# Patient Record
Sex: MALE | Race: WHITE | NOT HISPANIC OR LATINO | URBAN - METROPOLITAN AREA
[De-identification: names, ages, dates, MRNs, and addresses within clinical notes are randomized per-mention and may not be internally consistent; named-entity substitution may affect disease eponyms.]

---

## 2018-12-08 ENCOUNTER — EMERGENCY (EMERGENCY)
Facility: HOSPITAL | Age: 33
LOS: 0 days | Discharge: HOME | End: 2018-12-09
Admitting: PHYSICIAN ASSISTANT

## 2018-12-08 VITALS
RESPIRATION RATE: 18 BRPM | TEMPERATURE: 99 F | DIASTOLIC BLOOD PRESSURE: 79 MMHG | OXYGEN SATURATION: 97 % | SYSTOLIC BLOOD PRESSURE: 116 MMHG | HEART RATE: 107 BPM

## 2018-12-08 VITALS — WEIGHT: 229.94 LBS | HEIGHT: 78 IN

## 2018-12-08 DIAGNOSIS — Z79.899 OTHER LONG TERM (CURRENT) DRUG THERAPY: ICD-10-CM

## 2018-12-08 DIAGNOSIS — M79.671 PAIN IN RIGHT FOOT: ICD-10-CM

## 2018-12-08 DIAGNOSIS — Z88.0 ALLERGY STATUS TO PENICILLIN: ICD-10-CM

## 2018-12-08 RX ORDER — KETOROLAC TROMETHAMINE 30 MG/ML
30 SYRINGE (ML) INJECTION ONCE
Qty: 0 | Refills: 0 | Status: DISCONTINUED | OUTPATIENT
Start: 2018-12-08 | End: 2018-12-08

## 2018-12-08 RX ADMIN — Medication 30 MILLIGRAM(S): at 23:27

## 2018-12-08 NOTE — ED ADULT NURSE NOTE - NSIMPLEMENTINTERV_GEN_ALL_ED
Implemented All Universal Safety Interventions:  [place] to call system. Call bell, personal items and telephone within reach. Instruct patient to call for assistance. Room bathroom lighting operational. Non-slip footwear when patient is off stretcher. Physically safe environment: no spills, clutter or unnecessary equipment. Stretcher in lowest position, wheels locked, appropriate side rails in place.

## 2018-12-09 LAB — D DIMER BLD IA.RAPID-MCNC: 162 NG/ML DDU — SIGNIFICANT CHANGE UP (ref 0–230)

## 2018-12-09 RX ORDER — IBUPROFEN 200 MG
1 TABLET ORAL
Qty: 60 | Refills: 0 | OUTPATIENT
Start: 2018-12-09 | End: 2018-12-28

## 2018-12-09 NOTE — ED PROVIDER NOTE - NS ED ROS FT
Constitutional: no fever, chills, no recent weight loss, change in appetite or malaise  Cardiac: No chest pain, SOB or edema.  Respiratory: No cough or respiratory distress  GI: No nausea, vomiting, diarrhea or abdominal pain.  MS: + right foot/ankle swelling  Neuro: No decreased sensation, paresthesias  Skin: No skin rash/skin changes/redness  Except as documented in the HPI, all other systems are negative.

## 2018-12-09 NOTE — ED PROVIDER NOTE - PHYSICAL EXAMINATION
CONSTITUTIONAL: Well-appearing; well-nourished; in no apparent distress.   CARDIOVASCULAR: Normal S1, S2; no murmurs, rubs, or gallops.   RESPIRATORY: Normal chest excursion with respiration; breath sounds clear and equal bilaterally; no wheezes, rhonchi, or rales.  GI/: Normal bowel sounds; non-distended; non-tender; no palpable organomegaly.   MS: + swelling over right foot /ankle. + full rom of ankle/foot. + ttp over lateral malleolus and midfoot. Pedal pulses intact.  SKIN: Normal for age and race; no skin changes/erythema/warmth  NEURO/PSYCH: A & O x 4; grossly unremarkable. mood and manner are appropriate. Sensation intact.

## 2018-12-09 NOTE — ED PROVIDER NOTE - OBJECTIVE STATEMENT
33 year old male with hx of gout on colchicine/allopurinol c/o right foot/ankle pain and swelling x 1 day. Pt has had difficulty walking on his foot due to the pain. Denies any trauma, fever/chills, skin changes, warmth, paresthesias, calf pain, smoking hx, recent travel.

## 2018-12-09 NOTE — ED PROVIDER NOTE - NSFOLLOWUPCLINICS_GEN_ALL_ED_FT
[facility]  Medicine  [address], [place]   Phone: [phone]  Fax:   Follow Up Time:     [facility]  Pediatric  .  [place]   Phone: [phone]  Fax:   Follow Up Time:

## 2019-05-28 ENCOUNTER — EMERGENCY (EMERGENCY)
Facility: HOSPITAL | Age: 34
LOS: 0 days | Discharge: HOME | End: 2019-05-28
Admitting: EMERGENCY MEDICINE
Payer: SUBSIDIZED

## 2019-05-28 VITALS
SYSTOLIC BLOOD PRESSURE: 131 MMHG | WEIGHT: 229.94 LBS | DIASTOLIC BLOOD PRESSURE: 87 MMHG | TEMPERATURE: 98 F | OXYGEN SATURATION: 95 % | RESPIRATION RATE: 18 BRPM | HEART RATE: 70 BPM

## 2019-05-28 DIAGNOSIS — R07.0 PAIN IN THROAT: ICD-10-CM

## 2019-05-28 DIAGNOSIS — Z79.1 LONG TERM (CURRENT) USE OF NON-STEROIDAL ANTI-INFLAMMATORIES (NSAID): ICD-10-CM

## 2019-05-28 DIAGNOSIS — Z88.0 ALLERGY STATUS TO PENICILLIN: ICD-10-CM

## 2019-05-28 PROBLEM — M10.9 GOUT, UNSPECIFIED: Chronic | Status: ACTIVE | Noted: 2018-12-08

## 2019-05-28 PROCEDURE — 70360 X-RAY EXAM OF NECK: CPT | Mod: 26

## 2019-05-28 PROCEDURE — 99283 EMERGENCY DEPT VISIT LOW MDM: CPT | Mod: 25

## 2019-05-28 PROCEDURE — 99053 MED SERV 10PM-8AM 24 HR FAC: CPT

## 2019-05-28 RX ORDER — DEXAMETHASONE 0.5 MG/5ML
10 ELIXIR ORAL ONCE
Refills: 0 | Status: COMPLETED | OUTPATIENT
Start: 2019-05-28 | End: 2019-05-28

## 2019-05-28 RX ADMIN — Medication 10 MILLIGRAM(S): at 02:00

## 2019-05-28 NOTE — ED PROVIDER NOTE - CARE PROVIDER_API CALL
Gia Ac)  Otolaryngology; Surgery  [address] [place] [address]  Phone: [phone]  Fax: [phone]  Follow Up Time: 1-3 Days    Merrick Feliciano)  Otolaryngology  [address] [place] [address], [address] [place] [address]  Phone: [phone]  Fax: [phone]  Follow Up Time: 1-3 Days

## 2019-05-28 NOTE — ED PROVIDER NOTE - NSFOLLOWUPINSTRUCTIONS_ED_ALL_ED_FT
DISCHARGE INSTRUCTIONS:    Return to the emergency department if:     You have a fever.      You have severe abdominal pain, nausea, or vomiting.       Your vomit or saliva is bloody.      Your bowel movements are black or bloody.     Contact your healthcare provider if:     You do not find the object in your bowel movement within 2 or 3 days.      You do not want to eat because of abdominal pain or vomiting.      You are drooling or hoarse.      You have questions or concerns about your condition or care.    Look for the object in your bowel movements: Search for the dental work, battery, or other small, smooth object each time you have a bowel movement. Do not use laxatives or stool softeners. Do not force yourself to vomit. DISCHARGE INSTRUCTIONS:    Return to the emergency department if:     You have a fever.      You have severe abdominal pain, nausea, or vomiting.       Your vomit or saliva is bloody.      Your bowel movements are black or bloody.     Contact your healthcare provider if:     You do not find the object in your bowel movement within 2 or 3 days.      You do not want to eat because of abdominal pain or vomiting.      You are drooling or hoarse.      You have questions or concerns about your condition or care.    Look for the object in your bowel movements: Search for the dental work, battery, or other small, smooth object each time you have a bowel movement. Do not use laxatives or stool softeners. Do not force yourself to vomit.    Many things can cause a sore throat, including:  An infection.  Seasonal allergies.  Dryness in the air.  Irritants, such as smoke or pollution.  Gastroesophageal reflux disease (GERD).  A tumor.  A sore throat is often the first sign of another sickness. It may happen with other symptoms, such as coughing, sneezing, fever, and swollen neck glands. Most sore throats go away without medical treatment.    Follow these instructions at home:  Image Image   Take over-the-counter medicines only as told by your health care provider.  Drink enough fluids to keep your urine clear or pale yellow.  Rest as needed.  To help with pain, try:  Sipping warm liquids, such as broth, herbal tea, or warm water.  Eating or drinking cold or frozen liquids, such as frozen ice pops.  Gargling with a salt-water mixture 3–4 times a day or as needed. To make a salt-water mixture, completely dissolve ½–1 tsp of salt in 1 cup of warm water.  Sucking on hard candy or throat lozenges.  Putting a cool-mist humidifier in your bedroom at night to moisten the air.  Sitting in the bathroom with the door closed for 5–10 minutes while you run hot water in the shower.  Do not use any tobacco products, such as cigarettes, chewing tobacco, and e-cigarettes. If you need help quitting, ask your health care provider.  Contact a health care provider if:  You have a fever for more than 2–3 days.  You have symptoms that last (are persistent) for more than 2–3 days.  Your throat does not get better within 7 days.  You have a fever and your symptoms suddenly get worse.  Get help right away if:  You have difficulty breathing.  You cannot swallow fluids, soft foods, or your saliva.  You have increased swelling in your throat or neck.  You have persistent nausea and vomiting.  This information is not intended to replace advice given to you by your health care provider. Make sure you discuss any questions you have with your health care provider.

## 2019-05-28 NOTE — ED PROVIDER NOTE - CLINICAL SUMMARY MEDICAL DECISION MAKING FREE TEXT BOX
33 yo M with no pmhx presenting with sensation that something is stuck to left side of throat x 1 day. Soft tissue neck xr negative. Airway clear. No swelling noted. Will refer to ENT. I have discussed the discharge plan with the patient. The patient agrees with the plan, as discussed.  The patient understands Emergency Department diagnosis is a preliminary diagnosis often based on limited information and that the patient must adhere to the follow-up plan as discussed.  The patient understands that if the symptoms worsen or if prescribed medications do not have the desired/planned effect that the patient may return to the Emergency Department at any time for further evaluation and treatment.

## 2019-05-28 NOTE — ED PROVIDER NOTE - PROVIDER TOKENS
PROVIDER:[TOKEN:[[id number]],FOLLOWUP:[1-3 Days]],PROVIDER:[TOKEN:[[id number]],FOLLOWUP:[1-3 Days]]

## 2019-05-28 NOTE — ED PROVIDER NOTE - NS ED ROS FT
Review of Systems:  	•	CONSTITUTIONAL - no fever, no diaphoresis, no chills  	•	SKIN - no rash  	•	HEMATOLOGIC - no bleeding, no bruising  	•	EYES - no eye pain, no blurry vision  	•	ENT - +sensation of something in left side of throat, no congestion  	•	RESPIRATORY - no shortness of breath, no cough  	•	CARDIAC - no chest pain, no palpitations  	•	GI - no abd pain, no nausea, no vomiting, no diarrhea, no constipation  	•	GENITO-URINARY - no dysuria; no hematuria, no increased urinary frequency  	•	MUSCULOSKELETAL - no joint paint, no swelling, no redness  	•	NEUROLOGIC - no weakness, no headache, no paresthesias, no LOC  	All other ROS are negative except as documented in HPI.

## 2019-05-28 NOTE — ED PROVIDER NOTE - OBJECTIVE STATEMENT
35 yo M with no pmhx presenting with sensation that something is stuck to left side of throat x 1 day. Symptoms are moderate. No alleviating/aggravating factors. States he ate today with no issues. Never choked on food. 33 yo M with no pmhx presenting with sensation that something is stuck to left side of throat x 1 day. Symptoms are moderate. No alleviating/aggravating factors. States he ate today with no issues. Never choked on food. No difficulty breathing. No trismus. No cp, sob, fever, chills, abdominal pain, nausea, vomiting, diarrhea, back pain, urinary symptoms, headache, dizziness, paresthesias, or weakness.

## 2019-05-28 NOTE — ED PROVIDER NOTE - PHYSICAL EXAMINATION
VITAL SIGNS: I have reviewed nursing notes and confirm.  CONSTITUTIONAL: Well-developed; well-nourished; in no acute distress.  SKIN: Skin exam is warm and dry, no acute rash.  HEAD: Normocephalic; atraumatic.  EYES: PERRL, EOM intact; conjunctiva and sclera clear.  ENT: No nasal discharge; airway clear. Tonsils normal bilaterally. No swelling or erythema. No exudate. Uvula midline.   NECK: Supple; non tender.  CARD: S1, S2 normal; no murmurs, gallops, or rubs. Regular rate and rhythm.  RESP: Clear to auscultation bilaterally. No wheezes, rales or rhonchi.  ABD: Normal bowel sounds; soft; non-distended; non-tender.   EXT: Normal ROM. No edema.  LYMPH: No acute cervical adenopathy.  NEURO: Alert, oriented. Grossly unremarkable. No focal deficits.  PSYCH: Cooperative, appropriate.

## 2019-08-02 ENCOUNTER — EMERGENCY (EMERGENCY)
Facility: HOSPITAL | Age: 34
LOS: 0 days | Discharge: HOME | End: 2019-08-02
Admitting: EMERGENCY MEDICINE
Payer: SUBSIDIZED

## 2019-08-02 VITALS
DIASTOLIC BLOOD PRESSURE: 74 MMHG | RESPIRATION RATE: 18 BRPM | OXYGEN SATURATION: 96 % | SYSTOLIC BLOOD PRESSURE: 120 MMHG | HEART RATE: 106 BPM | TEMPERATURE: 98 F

## 2019-08-02 VITALS — HEART RATE: 92 BPM

## 2019-08-02 DIAGNOSIS — M25.462 EFFUSION, LEFT KNEE: ICD-10-CM

## 2019-08-02 DIAGNOSIS — M25.562 PAIN IN LEFT KNEE: ICD-10-CM

## 2019-08-02 PROCEDURE — 99283 EMERGENCY DEPT VISIT LOW MDM: CPT

## 2019-08-02 PROCEDURE — 73562 X-RAY EXAM OF KNEE 3: CPT | Mod: 26,LT

## 2019-08-02 RX ORDER — KETOROLAC TROMETHAMINE 30 MG/ML
60 SYRINGE (ML) INJECTION ONCE
Refills: 0 | Status: DISCONTINUED | OUTPATIENT
Start: 2019-08-02 | End: 2019-08-02

## 2019-08-02 RX ORDER — MELOXICAM 15 MG/1
1 TABLET ORAL
Qty: 10 | Refills: 0
Start: 2019-08-02 | End: 2019-08-11

## 2019-08-02 RX ORDER — DEXAMETHASONE 0.5 MG/5ML
10 ELIXIR ORAL ONCE
Refills: 0 | Status: COMPLETED | OUTPATIENT
Start: 2019-08-02 | End: 2019-08-02

## 2019-08-02 RX ADMIN — Medication 60 MILLIGRAM(S): at 16:26

## 2019-08-02 RX ADMIN — Medication 10 MILLIGRAM(S): at 17:18

## 2019-08-02 NOTE — ED PROVIDER NOTE - PHYSICAL EXAMINATION
CONST: Well appearing in NAD  EYES: PERRL, EOMI, Sclera and conjunctiva clear.   Throat: no swelling or exudates. mild erythema. No stridor  MS: Left knee with lateral tenderness and joint effusion noted. Pt has limited ROM due to effusion and pain. No skin changes or increase in tactile temp of skin overlying the swelling. NV intact distally. No midline spinal tenderness.  SKIN: Warm, dry, no acute rashes. Good turgor  NEURO: A&Ox3, No focal deficits. Strength 5/5 with no sensory deficits.

## 2019-08-02 NOTE — ED PROVIDER NOTE - NS ED ROS FT
CONST: No fever, chills or bodyaches  EYES: No pain, redness, drainage or visual changes.  ENT: No ear pain or discharge, nasal discharge or congestion. (+) sore throat  CARD: No chest pain, palpitations  RESP: No SOB, cough, hemoptysis. No hx of asthma or COPD  GI: No abdominal pain, N/V/D  SKIN: No rashes  NEURO: No headache, dizziness, paresthesias or LOC

## 2019-08-02 NOTE — ED ADULT NURSE NOTE - OBJECTIVE STATEMENT
Pt presents to the ED with c/o left knee pain for about 5 days after lifting a patient at work. Pt denies falling or any trauma to the knee.

## 2019-08-02 NOTE — ED PROVIDER NOTE - CLINICAL SUMMARY MEDICAL DECISION MAKING FREE TEXT BOX
Pt with left knee pain that occurred after "tweaking it" while lifting a pt while at work. Has swelling and pain. Xrays neg. No fever. No skin changes. Will give NSAIDS and FU with ortho and employee health. I have discussed the discharge plan with the patient. The patient agrees with the plan, as discussed.  The patient understands Emergency Department diagnosis is a preliminary diagnosis often based on limited information and that the patient must adhere to the follow-up plan as discussed.  The patient understands that if the symptoms worsen or if prescribed medications do not have the desired/planned effect that the patient may return to the Emergency Department at any time for further evaluation and treatment.

## 2019-08-02 NOTE — ED PROVIDER NOTE - OBJECTIVE STATEMENT
Pt has constant dull ache to left knee since "tweaking it" while lifting a patient while at work here at [facility] [employer] this week. pain was initially mild but over the last couple days the knee swelled and became more painful. Denies fever or recent rash or insect bite. No prior left knee issues.  Also c/o sore throat.

## 2019-08-02 NOTE — ED PROVIDER NOTE - CARE PROVIDER_API CALL
Mekhi Mallory)  Orthopaedic Surgery  [address] [place] [address]  Phone: [phone]  Fax: [phone]  Follow Up Time: 1-3 Days

## 2019-12-27 ENCOUNTER — OUTPATIENT (OUTPATIENT)
Dept: OUTPATIENT SERVICES | Facility: HOSPITAL | Age: 34
LOS: 1 days | Discharge: HOME | End: 2019-12-27

## 2019-12-27 VITALS
RESPIRATION RATE: 18 BRPM | HEIGHT: 71 IN | TEMPERATURE: 97 F | DIASTOLIC BLOOD PRESSURE: 82 MMHG | WEIGHT: 233.91 LBS | HEART RATE: 72 BPM | SYSTOLIC BLOOD PRESSURE: 134 MMHG | OXYGEN SATURATION: 100 %

## 2019-12-27 DIAGNOSIS — M25.562 PAIN IN LEFT KNEE: ICD-10-CM

## 2019-12-27 DIAGNOSIS — Z01.818 ENCOUNTER FOR OTHER PREPROCEDURAL EXAMINATION: ICD-10-CM

## 2019-12-27 NOTE — H&P PST ADULT - REASON FOR ADMISSION
33 yo m here for past, pt sched for left knee arth on 1/13/2020  denies any cp, palpitations,sob, fever, dysuria, uti.uri, no rec travels  exc gloria 2 fos wo sob  no glasses  no hear def  no loose teeth/ no dent

## 2019-12-27 NOTE — H&P PST ADULT - NSANTHOSAYNRD_GEN_A_CORE
No. NABEEL screening performed.  STOP BANG Legend: 0-2 = LOW Risk; 3-4 = INTERMEDIATE Risk; 5-8 = HIGH Risk

## 2021-07-23 ENCOUNTER — EMERGENCY (EMERGENCY)
Facility: HOSPITAL | Age: 36
LOS: 0 days | Discharge: HOME | End: 2021-07-23
Attending: EMERGENCY MEDICINE | Admitting: EMERGENCY MEDICINE
Payer: COMMERCIAL

## 2021-07-23 VITALS
HEART RATE: 112 BPM | RESPIRATION RATE: 18 BRPM | SYSTOLIC BLOOD PRESSURE: 145 MMHG | OXYGEN SATURATION: 95 % | DIASTOLIC BLOOD PRESSURE: 73 MMHG | HEIGHT: 71 IN | TEMPERATURE: 97 F | WEIGHT: 250 LBS

## 2021-07-23 VITALS — RESPIRATION RATE: 18 BRPM | HEART RATE: 87 BPM | OXYGEN SATURATION: 97 %

## 2021-07-23 DIAGNOSIS — Z88.0 ALLERGY STATUS TO PENICILLIN: ICD-10-CM

## 2021-07-23 DIAGNOSIS — M54.9 DORSALGIA, UNSPECIFIED: ICD-10-CM

## 2021-07-23 DIAGNOSIS — M79.18 MYALGIA, OTHER SITE: ICD-10-CM

## 2021-07-23 PROCEDURE — 99284 EMERGENCY DEPT VISIT MOD MDM: CPT

## 2021-07-23 RX ORDER — LIDOCAINE 4 G/100G
1 CREAM TOPICAL ONCE
Refills: 0 | Status: COMPLETED | OUTPATIENT
Start: 2021-07-23 | End: 2021-07-23

## 2021-07-23 RX ORDER — KETOROLAC TROMETHAMINE 30 MG/ML
30 SYRINGE (ML) INJECTION ONCE
Refills: 0 | Status: DISCONTINUED | OUTPATIENT
Start: 2021-07-23 | End: 2021-07-23

## 2021-07-23 RX ORDER — CYCLOBENZAPRINE HYDROCHLORIDE 10 MG/1
5 TABLET, FILM COATED ORAL ONCE
Refills: 0 | Status: DISCONTINUED | OUTPATIENT
Start: 2021-07-23 | End: 2021-07-23

## 2021-07-23 RX ORDER — METHOCARBAMOL 500 MG/1
1500 TABLET, FILM COATED ORAL ONCE
Refills: 0 | Status: DISCONTINUED | OUTPATIENT
Start: 2021-07-23 | End: 2021-07-23

## 2021-07-23 RX ORDER — CYCLOBENZAPRINE HYDROCHLORIDE 10 MG/1
1 TABLET, FILM COATED ORAL
Qty: 42 | Refills: 0
Start: 2021-07-23 | End: 2021-08-05

## 2021-07-23 RX ORDER — KETOROLAC TROMETHAMINE 30 MG/ML
1 SYRINGE (ML) INJECTION
Qty: 20 | Refills: 0
Start: 2021-07-23 | End: 2021-07-27

## 2021-07-23 RX ADMIN — Medication 30 MILLIGRAM(S): at 20:35

## 2021-07-23 RX ADMIN — LIDOCAINE 1 PATCH: 4 CREAM TOPICAL at 20:14

## 2021-07-23 RX ADMIN — Medication 30 MILLIGRAM(S): at 19:59

## 2021-07-23 RX ADMIN — CYCLOBENZAPRINE HYDROCHLORIDE 5 MILLIGRAM(S): 10 TABLET, FILM COATED ORAL at 20:14

## 2021-07-23 NOTE — ED PROVIDER NOTE - PATIENT PORTAL LINK FT
You can access the FollowMyHealth Patient Portal offered by [facility] by registering at the following website: [email or web address]. By joining [employer]’s FollowMyHealth portal, you will also be able to view your health information using other applications (apps) compatible with our system.

## 2021-07-23 NOTE — ED PROVIDER NOTE - NSFOLLOWUPCLINICS_GEN_ALL_ED_FT
[facility] ([facility])  Rehabilitation  [facility] [place] [address], [address] [place] [address]  Phone: [phone]  Fax:

## 2021-07-23 NOTE — ED PROVIDER NOTE - OBJECTIVE STATEMENT
36y M no pmh presenting with left buttock pain radiating down left leg. Acute onset. No f/c/n/v. Atraumatic. 36y M no pmh presenting with left buttock pain radiating down left leg. Acute onset. No f/c/n/v. Atraumatic. Constant, severe, worse with laying down flat, better with sitting up.

## 2021-07-23 NOTE — ED ADULT TRIAGE NOTE - WEIGHT IN LBS
Subjective:     Postpartum Day 2:  Delivery    Pain is well controlled on oral medication. Bleeding is mild. Patient is providing Formula to her infant.  She is ambulating well,  tolerating a general diet, and is voiding spontaneously. Flatus has been passed.  Has had BM.    Objective:      No data found.  General:    alert, appears stated age and cooperative   Lochia:  appropriate   Uterine Fundus:   firm   Incision:  healing well, no significant drainage, no dehiscence, no significant erythema   Extremities: Non-tender, trace edema     I/O last 3 completed shifts:  In: -   Out: 750 [Urine:750]    HCT (%)   Date Value   2020 28.7 (L)   11/15/2019 32.9 (L)       Surgical Care Improvement Project:    Leong in Place: No    DVT/VTE Prophylaxis:  VTE Pharmacologic Prophylaxis: No pharmacologic Venous Thromboembolism prophylaxis due to Patient fully ambulating and deemed to be low risk  VTE Mechanical Prophylaxis: No mechanical VTE prophylaxis due to Patient fully ambulating and deemed to be low risk    Antibiotics D/C'd within 24 hours of surgery : Yes  Central Line: N/A  Beta Blocker: N/A    Assessment:     Status post  section. Doing well postoperatively.     Plan:     Continue current care.   250

## 2021-07-23 NOTE — ED PROVIDER NOTE - CLINICAL SUMMARY MEDICAL DECISION MAKING FREE TEXT BOX
36-year-old male presenting with left buttock pain radiating down to the left leg.  Vitals reviewed by me noted to be tachycardic.  On exam with pain to palpation of the left buttock, pain with range of motion of the left hip.  No changes in sensation or strength of the left lower extremity.  denies any red flags such as, urinary retention, fecal incontinence, back pain.  Given Flexeril, Toradol, lidocaine patch, and Robaxin, with moderate resolution of pain.  Patient will follow up with outpatient PMD for follow-up.  Patient's symptoms consistent with sciatica.

## 2021-07-23 NOTE — ED PROVIDER NOTE - NS ED ROS FT
Eyes:  No visual changes, eye pain or discharge.  ENMT:  No hearing changes, pain, no sore throat or runny nose, no difficulty swallowing  Cardiac:  No chest pain, SOB or edema. No chest pain with exertion.  Respiratory:  No cough or respiratory distress. No hemoptysis. No history of asthma or RAD.  GI:  No nausea, vomiting, diarrhea or abdominal pain.  :  No dysuria, frequency or burning.  MS: see HPI  Neuro:  No headache or weakness.  No LOC.  Skin:  No skin rash.   Endocrine: No history of thyroid disease or diabetes.

## 2023-11-27 NOTE — ED PROVIDER NOTE - NS ED MD DISPO DISCHARGE CCDA
Goal Outcome Evaluation:      Plan of Care Reviewed With: patient    Overall Patient Progress: no changeOverall Patient Progress: no change        Pt a/ox4, calls for assist. Tmax 99.7 oral. vs stable. Refusing skin checks and very private about moving to bathroom and not wanting assistance, pt stated he was slightly unsteady in bathroom, I reinforced someone should walk with him and he very adamantly refused. Walker used but needs a bariatric walker, leans on walker. Light headed when lifting up head of bed, bp was stable, resolved quickly. Headache, oxycodone given per MAR for left lower extremity redness and swelling at 1am with good pain relief. Pt quite shy and not wanting staff to look at other leg for comparison, explained that this an assessment tool and we need to be able to do this. Bipap at 21% fio2 with 18/8 settings, tolerating well.                                             Patient/Caregiver provided printed discharge information.

## 2025-06-23 ENCOUNTER — APPOINTMENT (OUTPATIENT)
Dept: ORTHOPEDIC SURGERY | Facility: CLINIC | Age: 40
End: 2025-06-23
Payer: COMMERCIAL

## 2025-06-23 PROCEDURE — 99202 OFFICE O/P NEW SF 15 MIN: CPT

## 2025-06-23 PROCEDURE — 73110 X-RAY EXAM OF WRIST: CPT | Mod: RT
